# Patient Record
Sex: MALE | Race: WHITE | NOT HISPANIC OR LATINO | Employment: FULL TIME | URBAN - NONMETROPOLITAN AREA
[De-identification: names, ages, dates, MRNs, and addresses within clinical notes are randomized per-mention and may not be internally consistent; named-entity substitution may affect disease eponyms.]

---

## 2023-03-02 LAB
ACTIVATED PARTIAL THROMBOPLASTIN TIME IN PPP BY COAGULATION ASSAY: 44 SEC (ref 26–39)
ANION GAP IN SER/PLAS: 14 MMOL/L (ref 10–20)
CALCIUM (MG/DL) IN SER/PLAS: 9.6 MG/DL (ref 8.6–10.3)
CARBON DIOXIDE, TOTAL (MMOL/L) IN SER/PLAS: 26 MMOL/L (ref 21–32)
CHLORIDE (MMOL/L) IN SER/PLAS: 102 MMOL/L (ref 98–107)
CREATININE (MG/DL) IN SER/PLAS: 0.87 MG/DL (ref 0.5–1.3)
ERYTHROCYTE DISTRIBUTION WIDTH (RATIO) BY AUTOMATED COUNT: 13.6 % (ref 11.5–14.5)
ERYTHROCYTE MEAN CORPUSCULAR HEMOGLOBIN CONCENTRATION (G/DL) BY AUTOMATED: 34.7 G/DL (ref 32–36)
ERYTHROCYTE MEAN CORPUSCULAR VOLUME (FL) BY AUTOMATED COUNT: 92 FL (ref 80–100)
ERYTHROCYTES (10*6/UL) IN BLOOD BY AUTOMATED COUNT: 5.09 X10E12/L (ref 4.5–5.9)
GFR MALE: >90 ML/MIN/1.73M2
GLUCOSE (MG/DL) IN SER/PLAS: 88 MG/DL (ref 74–99)
HEMATOCRIT (%) IN BLOOD BY AUTOMATED COUNT: 46.7 % (ref 41–52)
HEMOGLOBIN (G/DL) IN BLOOD: 16.2 G/DL (ref 13.5–17.5)
INR IN PPP BY COAGULATION ASSAY: 1.4 (ref 0.9–1.1)
LEUKOCYTES (10*3/UL) IN BLOOD BY AUTOMATED COUNT: 8.7 X10E9/L (ref 4.4–11.3)
PLATELETS (10*3/UL) IN BLOOD AUTOMATED COUNT: 185 X10E9/L (ref 150–450)
POTASSIUM (MMOL/L) IN SER/PLAS: 4.1 MMOL/L (ref 3.5–5.3)
PROTHROMBIN TIME (PT) IN PPP BY COAGULATION ASSAY: 16.4 SEC (ref 9.8–13.4)
SODIUM (MMOL/L) IN SER/PLAS: 138 MMOL/L (ref 136–145)
UREA NITROGEN (MG/DL) IN SER/PLAS: 15 MG/DL (ref 6–23)

## 2023-06-13 PROBLEM — I48.0 PAROXYSMAL ATRIAL FIBRILLATION (MULTI): Status: ACTIVE | Noted: 2023-06-13

## 2023-06-13 PROBLEM — I10 BENIGN HYPERTENSION: Status: ACTIVE | Noted: 2023-06-13

## 2023-06-13 NOTE — PROGRESS NOTES
Subjective   Patient ID:   Armaan Lane is a 68 y.o. male who presents for Establish Care.  HPI  New patient here today to establish care with myself.  Last PCP: Dr. Goznalez  Last seen: 2017    A-fib/HTN:  Seeing cardiology - Dr. Saha.  Taking Eliquis, Metoprolol, Losartan.  BP today is 128/80.  Denies CP, SOB.    HLD:  Taking Rosuvastatin.  DUE for labs.    Forgetfulness:  Has family history of dementia.  Family has been worried that he has been more forgetful lately.  Would like to see neurology.    Health maintenance:  Smoking: Never a smoker.  PSA (50+): DUE  Labs: March 2023 (basic). DUE  Colonoscopy (50-75): DUE  Prevnar 13 (65+):  Pneumovax 23 (65+, 1 year after Prev 13):  Influenza:  Zostavax (60+, 1 time, at pharmacy):    Review of Systems  12 point review of systems negative unless stated above in HPI    Vitals:    06/21/23 1021   BP: 128/80   Pulse: 95   SpO2: 96%       Physical Exam  General: Alert and oriented, well nourished, no acute distress.  Lungs: Clear to auscultation, non-labored respiration.  Heart: Normal rate, regular rhythm, no murmur, gallop or edema.  Neurologic: Awake, alert, and oriented X3, CN II-XII intact.  Psychiatric: Cooperative, appropriate mood and affect.    Assessment/Plan   It was nice meeting you!  I have ordered some labs to be done as soon as you can.  We will call you with the results.  Continue specialty care.  I have placed a referral to neurology for further management.  Continue the same medications.  Chronic conditions are stable.  Call with questions or concerns.    F/u  6 months  Diagnoses and all orders for this visit:  Benign hypertension  -     Comprehensive Metabolic Panel; Future  -     Lipid Panel; Future  -     CBC and Auto Differential; Future  Paroxysmal atrial fibrillation (CMS/HCC)  Screening PSA (prostate specific antigen)  -     Prostate Specific Antigen, Screen; Future  Obesity, morbid (CMS/HCC)  Pure hypercholesterolemia  Forgetfulness  -      Referral to Neurology; Future

## 2023-06-21 ENCOUNTER — OFFICE VISIT (OUTPATIENT)
Dept: PRIMARY CARE | Facility: CLINIC | Age: 68
End: 2023-06-21
Payer: MEDICARE

## 2023-06-21 VITALS
WEIGHT: 267.1 LBS | OXYGEN SATURATION: 96 % | BODY MASS INDEX: 41.92 KG/M2 | DIASTOLIC BLOOD PRESSURE: 80 MMHG | SYSTOLIC BLOOD PRESSURE: 128 MMHG | HEART RATE: 95 BPM | HEIGHT: 67 IN

## 2023-06-21 DIAGNOSIS — Z12.5 SCREENING PSA (PROSTATE SPECIFIC ANTIGEN): ICD-10-CM

## 2023-06-21 DIAGNOSIS — R68.89 FORGETFULNESS: ICD-10-CM

## 2023-06-21 DIAGNOSIS — E66.01 OBESITY, MORBID (MULTI): ICD-10-CM

## 2023-06-21 DIAGNOSIS — I10 BENIGN HYPERTENSION: Primary | ICD-10-CM

## 2023-06-21 DIAGNOSIS — E78.00 PURE HYPERCHOLESTEROLEMIA: ICD-10-CM

## 2023-06-21 DIAGNOSIS — I48.0 PAROXYSMAL ATRIAL FIBRILLATION (MULTI): ICD-10-CM

## 2023-06-21 PROCEDURE — 1160F RVW MEDS BY RX/DR IN RCRD: CPT | Performed by: PHYSICIAN ASSISTANT

## 2023-06-21 PROCEDURE — 3079F DIAST BP 80-89 MM HG: CPT | Performed by: PHYSICIAN ASSISTANT

## 2023-06-21 PROCEDURE — 99203 OFFICE O/P NEW LOW 30 MIN: CPT | Performed by: PHYSICIAN ASSISTANT

## 2023-06-21 PROCEDURE — 1036F TOBACCO NON-USER: CPT | Performed by: PHYSICIAN ASSISTANT

## 2023-06-21 PROCEDURE — 3074F SYST BP LT 130 MM HG: CPT | Performed by: PHYSICIAN ASSISTANT

## 2023-06-21 PROCEDURE — 1159F MED LIST DOCD IN RCRD: CPT | Performed by: PHYSICIAN ASSISTANT

## 2023-06-21 PROCEDURE — 1170F FXNL STATUS ASSESSED: CPT | Performed by: PHYSICIAN ASSISTANT

## 2023-06-21 RX ORDER — METOPROLOL TARTRATE 50 MG/1
50 TABLET ORAL
COMMUNITY
Start: 2023-05-31

## 2023-06-21 RX ORDER — DOFETILIDE 0.25 MG/1
1 CAPSULE ORAL 2 TIMES DAILY
COMMUNITY
Start: 2015-06-02

## 2023-06-21 RX ORDER — METOPROLOL TARTRATE 25 MG/1
25 TABLET, FILM COATED ORAL 2 TIMES DAILY
COMMUNITY
Start: 2023-04-05

## 2023-06-21 RX ORDER — APIXABAN 5 MG/1
5 TABLET, FILM COATED ORAL 2 TIMES DAILY
COMMUNITY

## 2023-06-21 RX ORDER — ROSUVASTATIN CALCIUM 5 MG/1
5 TABLET, COATED ORAL DAILY
COMMUNITY
Start: 2023-04-05

## 2023-06-21 RX ORDER — LOSARTAN POTASSIUM 100 MG/1
50 TABLET ORAL DAILY
COMMUNITY

## 2023-06-21 ASSESSMENT — ACTIVITIES OF DAILY LIVING (ADL)
DRESSING: INDEPENDENT
JUDGMENT_ADEQUATE_SAFELY_COMPLETE_DAILY_ACTIVITIES: YES
ADEQUATE_TO_COMPLETE_ADL: YES
TAKING MEDICATION: INDEPENDENT
HEARING - LEFT EAR: FUNCTIONAL
HEARING - RIGHT EAR: HEARING AID
TOILETING: INDEPENDENT
PREPARING MEALS: INDEPENDENT
MANAGING FINANCES: INDEPENDENT
USING TRANSPORTATION: INDEPENDENT
FEEDING YOURSELF: INDEPENDENT
WALKS IN HOME: INDEPENDENT
DOING HOUSEWORK: INDEPENDENT
ADEQUATE_TO_COMPLETE_ADL: YES
BATHING: INDEPENDENT
PATIENT'S MEMORY ADEQUATE TO SAFELY COMPLETE DAILY ACTIVITIES?: YES
TOILETING: INDEPENDENT
PILL BOX USED: YES
BATHING: INDEPENDENT
DRESSING YOURSELF: INDEPENDENT
EATING: INDEPENDENT
NEEDS ASSISTANCE WITH FOOD: INDEPENDENT
JUDGMENT_ADEQUATE_SAFELY_COMPLETE_DAILY_ACTIVITIES: YES
USING TELEPHONE: INDEPENDENT
GROOMING: INDEPENDENT
STIL DRIVING: YES
GROCERY SHOPPING: INDEPENDENT
FEEDING: INDEPENDENT

## 2023-06-21 ASSESSMENT — PATIENT HEALTH QUESTIONNAIRE - PHQ9
2. FEELING DOWN, DEPRESSED OR HOPELESS: NOT AT ALL
1. LITTLE INTEREST OR PLEASURE IN DOING THINGS: NOT AT ALL
SUM OF ALL RESPONSES TO PHQ9 QUESTIONS 1 AND 2: 0

## 2023-12-18 PROBLEM — L03.116 CELLULITIS OF LEFT LOWER EXTREMITY: Status: ACTIVE | Noted: 2017-01-31

## 2023-12-18 PROBLEM — F90.9 ADHD (ATTENTION DEFICIT HYPERACTIVITY DISORDER): Status: ACTIVE | Noted: 2023-12-18

## 2023-12-18 PROBLEM — F32.A DEPRESSION: Status: ACTIVE | Noted: 2023-12-18

## 2023-12-18 PROBLEM — S81.801A TRAUMATIC OPEN WOUND OF RIGHT LOWER LEG: Status: ACTIVE | Noted: 2023-12-18

## 2023-12-18 PROBLEM — R60.0 PEDAL EDEMA: Status: ACTIVE | Noted: 2023-12-18

## 2023-12-18 PROBLEM — L02.415 ABSCESS OF LEG, RIGHT: Status: ACTIVE | Noted: 2023-12-18

## 2023-12-18 PROBLEM — N40.0 BENIGN PROSTATIC HYPERPLASIA: Status: ACTIVE | Noted: 2023-12-18

## 2023-12-20 ENCOUNTER — APPOINTMENT (OUTPATIENT)
Dept: PRIMARY CARE | Facility: CLINIC | Age: 68
End: 2023-12-20
Payer: MEDICARE

## 2024-07-22 NOTE — PATIENT INSTRUCTIONS
It was nice meeting you today, Taqueria!     As we discussed, I have placed some routine lab orders in the system that were not completed during your recent hospital stay.   Please have your blood drawn in the next 1-2 weeks.   You need to be FASTING for 12 hours prior to blood draw.  Please contact your insurance company to ask about the best location to get blood drawn.  We will contact you with the results of your blood work and any necessary adjustments to your plan of care.  Iif you do not hear from us within 3-5 days of having your blood drawn, please call the [place] office at [phone]     I also ordered a Cologuard for you. It will be sent to your home to complete.      Additionally, I ordered the CT Cardiac Calcium score test. This is free and is offered by .   What is Coronary Artery Calcium Scoring?    -This test is used to determine your future risk of heart attack.  -A calcium score test is a noninvasive, low dose CT scan, performed without an intravenous line or any contrast material.  -The appointment takes approximately 20 to 30 minutes, with the actual CT scan about 10 to 15 minutes of that time.  -Coronary calcium scoring involves a small portion of radiation, similar to or lower than that used in other screenings.  -The test measures the amount of calcium that has accumulated in the walls of the coronary arteries and provides what is termed a “coronary artery calcium score” to help interpret your risk level.    Who Should Get a Calcium Score Test?    Men and women age 45 or older, with no history of coronary artery disease, and with one or more risk factors for heart disease, including:  -High blood cholesterol  -Low HDL cholesterol (“good cholesterol”)  -High blood pressure  -Cigarette smoking  -Type 2 diabetes  -Family history of heart disease at age 55 or younger in men and 65 or younger in women  -Chronic inflammatory condition (e.g., inflammatory bowel disease, lupus, rheumatoid  arthritis, ankylosing spondylitis, psoriasis, HIV/AIDS)  -Female conditions such as preeclampsia or early menopause (younger than 40)  -Chronic kidney disease     Follow up with cardiology as scheduled   Weight yourself daily around the same time every day. If you notice more than 2-3 pound weight gain in one day, please call.     Follow up in 3 months.

## 2024-07-22 NOTE — PROGRESS NOTES
Patient ID:   Armaan Lane is a 69 y.o. male with PMH remarkable for Hypertension, pAF, BPH, Obesity, Depression, ADHD, CHF, and Hyperlipidemia who presents to the office today to establish care and as a follow up visit from the ER.   No chief complaint on file..    HEALTH MAINTENANCE: Establish Care  Previous PCP: Aguilar Zamarripa PA-C  Last Office Visit: 6/21/2023  Last Labs: 7/19/2024  PSA Screening (starting at age 55 till 69): Ordered   Colonoscopy (45-75 or age 40 with 1st degree relative dx colon ca): Never had a colonoscopy; denies family hx, willing to do Cologuard   Lung cancer screening (50-76 y/o + 20 pack year + smoking/quit in last 15 years): NA   Cardiac Calcium Scoring (55+, q 10 years): ordered   DEXA (65+, q 2 years):     Armaan was recently in the ER and then admitted to the ICU. He was found to be in A-Fib with RVR, pulmonary edema, and septic secondary to a UTI. He was there from 7/13-7/19.  Following with Dr. Saha in Cardiology 7/30/2024    Today he reports feeling much better.     HPI  Hypertension  Today BP in office is stable at 117/72  Denies SOB, CP, no palpitations, headaches    -currently taking Losartan 50 mg every day instead of the 100 mg as directed on the discharge summary   -Will increase dose when he gets home   -Cardizem 240 mg every day    A-Fib  He was admitted to ICU on cardizem drip. His HR improved, and transitioned to oral cardizem and started on Digoxin after loading dose. HR improved significantly. Cardiology recommended discontinuing Tikosyn.  -Digoxin 125 mcg every day   -Metoprolol 75 mg BID  -Eliquis 5 mg BID       Hyperlipidemia  Last lipid panel 12/2021 WNL  -Crestor 5 mg every day     CHF  During his hospital stay, he had an echocardiogram done that showed normal LVEF and no regional wall motion abnormalities.   -Lasix 40 mg every day   -Compression socks     Social History     Tobacco Use    Smoking status: Never    Smokeless tobacco: Never   Vaping Use     Vaping status: Never Used   Substance Use Topics    Alcohol use: Never    Drug use: Never       Immunization History   Administered Date(s) Administered    Flu vaccine, quadrivalent, high-dose, preservative free, age 65y+ (FLUZONE) 11/24/2020    Influenza, High Dose Seasonal, Preservative Free 10/12/2019    Influenza, Seasonal, Quadrivalent, Adjuvanted 11/17/2021    Influenza, seasonal, injectable, preservative free 10/22/2015, 11/22/2017    Moderna SARS-CoV-2 Vaccination 07/11/2021, 08/08/2021       Review of Systems   Constitutional:  Positive for fatigue.   Respiratory: Negative.  Negative for cough and shortness of breath.    Cardiovascular: Negative.  Negative for chest pain, palpitations and leg swelling.   Gastrointestinal: Negative.    Genitourinary: Negative.  Negative for frequency, hematuria and urgency.   Neurological: Negative.  Negative for dizziness, light-headedness and headaches.   Psychiatric/Behavioral: Negative.  Negative for sleep disturbance. The patient is not nervous/anxious.        No Known Allergies     Visit Vitals  Smoking Status Never       Physical Exam  Constitutional:       General: He is not in acute distress.     Appearance: Normal appearance. He is obese.   HENT:      Head: Normocephalic.   Cardiovascular:      Rate and Rhythm: Normal rate. Rhythm irregular.      Heart sounds: Normal heart sounds. No murmur heard.     No friction rub. No gallop.   Pulmonary:      Effort: Pulmonary effort is normal.      Breath sounds: Normal breath sounds. No wheezing, rhonchi or rales.   Abdominal:      General: Bowel sounds are normal. There is no distension.      Palpations: Abdomen is soft.      Tenderness: There is no abdominal tenderness.   Musculoskeletal:         General: Normal range of motion.      Cervical back: Normal range of motion.   Skin:     General: Skin is warm.   Neurological:      Mental Status: He is alert and oriented to person, place, and time.   Psychiatric:         Mood  and Affect: Mood normal.         Behavior: Behavior normal.         Current Outpatient Medications   Medication Instructions    Eliquis 5 mg, oral, 2 times daily    losartan (COZAAR) 50 mg, oral, Daily    metoprolol tartrate (LOPRESSOR) 25 mg, oral, 2 times daily    metoprolol tartrate (LOPRESSOR) 50 mg, oral, 2 times daily (morning and late afternoon)    rosuvastatin (CRESTOR) 5 mg, oral, Daily    Tikosyn 250 mcg capsule 1 capsule, oral, 2 times daily       Lab Results   Component Value Date    WBC 8.7 03/02/2023    HGB 16.2 03/02/2023    HCT 46.7 03/02/2023     03/02/2023    CHOL 121 12/18/2021    TRIG 71 12/18/2021    HDL 40.4 12/18/2021    ALT 27 12/18/2021    AST 20 12/18/2021     03/02/2023    K 4.1 03/02/2023     03/02/2023    CREATININE 0.87 03/02/2023    BUN 15 03/02/2023    CO2 26 03/02/2023    INR 1.4 (H) 03/02/2023    HGBA1C 6.0 07/14/2024       ASSESSMENT AND PLAN:  Assessment/Plan   Problem List Items Addressed This Visit             ICD-10-CM    Benign hypertension I10     As noted in HPI  Today BP in office is stable at 117/72  Denies SOB, CP, palpitations, and headaches    -currently taking Losartan 50 mg every day instead of the 100 mg as directed on the discharge summary   -Will increase dose when he gets home   -Cardizem 240 mg every day         Paroxysmal atrial fibrillation (Multi) I48.0     As noted in HPI  He was admitted to ICU on cardizem drip. His HR improved, and transitioned to oral cardizem and started on Digoxin after loading dose. HR improved significantly. Cardiology recommended discontinuing Tikosyn.  -Digoxin 125 mcg every day   -Metoprolol 75 mg BID  -Eliquis 5 mg BID   -On auscultation today he is in a-fib.   -Follow up with cardiology on 7/30.          Relevant Medications    dilTIAZem CD (Cardizem CD) 240 mg 24 hr capsule    digoxin (Lanoxin) 125 MCG tablet    Pure hypercholesterolemia E78.00     Last lipid panel 12/2021 WNL  -Crestor 5 mg every day   -Will  repeat labs  -Ordered CT Calcium Cardiac Score          Acute left-sided CHF (congestive heart failure) (Multi) I50.1     During his hospital stay from 7/13-7/19, he had an echocardiogram done that showed normal LVEF and no regional wall motion abnormalities.   -Lasix 40 mg every day   -Compression socks   -Discussed weighing daily to monitor for weight changes of more than 2-3 pounds at a time  -Discussed low sodium and heart healthy diet          Relevant Medications    dilTIAZem CD (Cardizem CD) 240 mg 24 hr capsule    digoxin (Lanoxin) 125 MCG tablet     Other Visit Diagnoses         Codes    Screening PSA (prostate specific antigen)    -  Primary Z12.5    Relevant Orders    Prostate Specific Antigen, Screen    Healthcare maintenance     Z00.00    Relevant Orders    CBC and Auto Differential    Comprehensive Metabolic Panel    Lipid Panel    Hemoglobin A1C    Vitamin D 25-Hydroxy,Total (for eval of Vitamin D levels)    Prostate Specific Antigen, Screen    Encounter for colorectal cancer screening using Cologuard test     Z12.11, Z12.12    Relevant Orders    Cologuard® colon cancer screening    Screening for cardiovascular condition     Z13.6    Relevant Orders    CT cardiac scoring wo IV contrast    Screening due     Z13.9    Relevant Orders    Hepatitis C antibody    Acute cystitis without hematuria     N30.00    Relevant Orders    CBC and Auto Differential    Body mass index (BMI) 40.0-44.9, adult (Multi)     Z68.41    Relevant Orders    Vitamin D 25-Hydroxy,Total (for eval of Vitamin D levels)          Assessment/Plan   --------------------  PSA  Hep C  Yearly labs  Cologaurd  CT cardiac calcium scoring  Daily weights  Increase Losartan from 50 -100 mg every day as directed   Follow up in 3 months

## 2024-07-25 PROBLEM — E66.812 OBESITY, CLASS II, BMI 35-39.9: Status: ACTIVE | Noted: 2024-07-16

## 2024-07-25 PROBLEM — I50.1: Status: ACTIVE | Noted: 2024-07-14

## 2024-07-25 PROBLEM — E66.9 OBESITY, CLASS II, BMI 35-39.9: Status: ACTIVE | Noted: 2024-07-16

## 2024-07-26 ENCOUNTER — OFFICE VISIT (OUTPATIENT)
Dept: PRIMARY CARE | Facility: CLINIC | Age: 69
End: 2024-07-26
Payer: MEDICARE

## 2024-07-26 VITALS
DIASTOLIC BLOOD PRESSURE: 72 MMHG | BODY MASS INDEX: 42.19 KG/M2 | WEIGHT: 268.8 LBS | HEART RATE: 85 BPM | OXYGEN SATURATION: 94 % | HEIGHT: 67 IN | SYSTOLIC BLOOD PRESSURE: 117 MMHG

## 2024-07-26 DIAGNOSIS — E78.00 PURE HYPERCHOLESTEROLEMIA: ICD-10-CM

## 2024-07-26 DIAGNOSIS — N30.00 ACUTE CYSTITIS WITHOUT HEMATURIA: ICD-10-CM

## 2024-07-26 DIAGNOSIS — Z12.12 ENCOUNTER FOR COLORECTAL CANCER SCREENING USING COLOGUARD TEST: ICD-10-CM

## 2024-07-26 DIAGNOSIS — I10 BENIGN HYPERTENSION: ICD-10-CM

## 2024-07-26 DIAGNOSIS — Z00.00 HEALTHCARE MAINTENANCE: ICD-10-CM

## 2024-07-26 DIAGNOSIS — Z12.5 SCREENING PSA (PROSTATE SPECIFIC ANTIGEN): Primary | ICD-10-CM

## 2024-07-26 DIAGNOSIS — I50.1 ACUTE LEFT-SIDED CHF (CONGESTIVE HEART FAILURE) (MULTI): ICD-10-CM

## 2024-07-26 DIAGNOSIS — I48.0 PAROXYSMAL ATRIAL FIBRILLATION (MULTI): ICD-10-CM

## 2024-07-26 DIAGNOSIS — Z12.11 ENCOUNTER FOR COLORECTAL CANCER SCREENING USING COLOGUARD TEST: ICD-10-CM

## 2024-07-26 DIAGNOSIS — Z13.6 SCREENING FOR CARDIOVASCULAR CONDITION: ICD-10-CM

## 2024-07-26 DIAGNOSIS — Z13.9 SCREENING DUE: ICD-10-CM

## 2024-07-26 PROCEDURE — 3074F SYST BP LT 130 MM HG: CPT

## 2024-07-26 PROCEDURE — 1124F ACP DISCUSS-NO DSCNMKR DOCD: CPT

## 2024-07-26 PROCEDURE — 1126F AMNT PAIN NOTED NONE PRSNT: CPT

## 2024-07-26 PROCEDURE — 1159F MED LIST DOCD IN RCRD: CPT

## 2024-07-26 PROCEDURE — 1036F TOBACCO NON-USER: CPT

## 2024-07-26 PROCEDURE — 1160F RVW MEDS BY RX/DR IN RCRD: CPT

## 2024-07-26 PROCEDURE — 1157F ADVNC CARE PLAN IN RCRD: CPT

## 2024-07-26 PROCEDURE — 3078F DIAST BP <80 MM HG: CPT

## 2024-07-26 PROCEDURE — 99496 TRANSJ CARE MGMT HIGH F2F 7D: CPT

## 2024-07-26 RX ORDER — FUROSEMIDE 40 MG/1
40 TABLET ORAL DAILY
COMMUNITY

## 2024-07-26 RX ORDER — DILTIAZEM HYDROCHLORIDE 240 MG/1
240 CAPSULE, COATED, EXTENDED RELEASE ORAL DAILY
COMMUNITY

## 2024-07-26 RX ORDER — DIGOXIN 125 MCG
125 TABLET ORAL DAILY
COMMUNITY

## 2024-07-26 ASSESSMENT — COLUMBIA-SUICIDE SEVERITY RATING SCALE - C-SSRS
6. HAVE YOU EVER DONE ANYTHING, STARTED TO DO ANYTHING, OR PREPARED TO DO ANYTHING TO END YOUR LIFE?: NO
2. HAVE YOU ACTUALLY HAD ANY THOUGHTS OF KILLING YOURSELF?: NO
1. IN THE PAST MONTH, HAVE YOU WISHED YOU WERE DEAD OR WISHED YOU COULD GO TO SLEEP AND NOT WAKE UP?: NO

## 2024-07-26 ASSESSMENT — ENCOUNTER SYMPTOMS
FREQUENCY: 0
RESPIRATORY NEGATIVE: 1
CARDIOVASCULAR NEGATIVE: 1
HEADACHES: 0
PALPITATIONS: 0
FATIGUE: 1
GASTROINTESTINAL NEGATIVE: 1
LIGHT-HEADEDNESS: 0
PSYCHIATRIC NEGATIVE: 1
NEUROLOGICAL NEGATIVE: 1
HEMATURIA: 0
DIZZINESS: 0
COUGH: 0
SHORTNESS OF BREATH: 0
NERVOUS/ANXIOUS: 0
SLEEP DISTURBANCE: 0

## 2024-07-26 ASSESSMENT — PAIN SCALES - GENERAL: PAINLEVEL: 0-NO PAIN

## 2024-07-26 NOTE — ASSESSMENT & PLAN NOTE
During his hospital stay from 7/13-7/19, he had an echocardiogram done that showed normal LVEF and no regional wall motion abnormalities.   -Lasix 40 mg every day   -Compression socks   -Discussed weighing daily to monitor for weight changes of more than 2-3 pounds at a time  -Discussed low sodium and heart healthy diet

## 2024-07-26 NOTE — PROGRESS NOTES
Pt states he has had a lack of energy, follow up from hospital keeps getting hot flashes and getting sweating.

## 2024-07-26 NOTE — ASSESSMENT & PLAN NOTE
As noted in HPI  Today BP in office is stable at 117/72  Denies SOB, CP, palpitations, and headaches    -currently taking Losartan 50 mg every day instead of the 100 mg as directed on the discharge summary   -Will increase dose when he gets home   -Cardizem 240 mg every day

## 2024-07-26 NOTE — ASSESSMENT & PLAN NOTE
As noted in HPI  He was admitted to ICU on cardizem drip. His HR improved, and transitioned to oral cardizem and started on Digoxin after loading dose. HR improved significantly. Cardiology recommended discontinuing Tikosyn.  -Digoxin 125 mcg every day   -Metoprolol 75 mg BID  -Eliquis 5 mg BID   -On auscultation today he is in a-fib.   -Follow up with cardiology on 7/30.

## 2024-07-26 NOTE — ASSESSMENT & PLAN NOTE
Last lipid panel 12/2021 WNL  -Crestor 5 mg every day   -Will repeat labs  -Ordered CT Calcium Cardiac Score

## 2024-07-27 ENCOUNTER — LAB (OUTPATIENT)
Dept: LAB | Facility: LAB | Age: 69
End: 2024-07-27
Payer: MEDICARE

## 2024-07-27 DIAGNOSIS — Z13.9 SCREENING DUE: ICD-10-CM

## 2024-07-27 DIAGNOSIS — N30.00 ACUTE CYSTITIS WITHOUT HEMATURIA: ICD-10-CM

## 2024-07-27 DIAGNOSIS — Z00.00 HEALTHCARE MAINTENANCE: ICD-10-CM

## 2024-07-27 DIAGNOSIS — Z12.5 SCREENING PSA (PROSTATE SPECIFIC ANTIGEN): ICD-10-CM

## 2024-07-27 LAB
25(OH)D3 SERPL-MCNC: 21 NG/ML (ref 30–100)
ALBUMIN SERPL BCP-MCNC: 3.7 G/DL (ref 3.4–5)
ALP SERPL-CCNC: 56 U/L (ref 33–136)
ALT SERPL W P-5'-P-CCNC: 23 U/L (ref 10–52)
ANION GAP SERPL CALC-SCNC: 14 MMOL/L (ref 10–20)
AST SERPL W P-5'-P-CCNC: 15 U/L (ref 9–39)
BASOPHILS # BLD AUTO: 0.06 X10*3/UL (ref 0–0.1)
BASOPHILS NFR BLD AUTO: 0.6 %
BILIRUB SERPL-MCNC: 0.7 MG/DL (ref 0–1.2)
BUN SERPL-MCNC: 17 MG/DL (ref 6–23)
CALCIUM SERPL-MCNC: 8.8 MG/DL (ref 8.6–10.3)
CHLORIDE SERPL-SCNC: 100 MMOL/L (ref 98–107)
CHOLEST SERPL-MCNC: 122 MG/DL (ref 0–199)
CHOLESTEROL/HDL RATIO: 3.2
CO2 SERPL-SCNC: 27 MMOL/L (ref 21–32)
CREAT SERPL-MCNC: 0.84 MG/DL (ref 0.5–1.3)
EGFRCR SERPLBLD CKD-EPI 2021: >90 ML/MIN/1.73M*2
EOSINOPHIL # BLD AUTO: 0.16 X10*3/UL (ref 0–0.4)
EOSINOPHIL NFR BLD AUTO: 1.5 %
ERYTHROCYTE [DISTWIDTH] IN BLOOD BY AUTOMATED COUNT: 14 % (ref 11.5–14.5)
EST. AVERAGE GLUCOSE BLD GHB EST-MCNC: 126 MG/DL
GLUCOSE SERPL-MCNC: 99 MG/DL (ref 74–99)
HBA1C MFR BLD: 6 %
HCT VFR BLD AUTO: 42 % (ref 41–52)
HCV AB SER QL: NONREACTIVE
HDLC SERPL-MCNC: 37.9 MG/DL
HGB BLD-MCNC: 13.5 G/DL (ref 13.5–17.5)
IMM GRANULOCYTES # BLD AUTO: 0.03 X10*3/UL (ref 0–0.5)
IMM GRANULOCYTES NFR BLD AUTO: 0.3 % (ref 0–0.9)
LDLC SERPL CALC-MCNC: 66 MG/DL
LYMPHOCYTES # BLD AUTO: 2.95 X10*3/UL (ref 0.8–3)
LYMPHOCYTES NFR BLD AUTO: 27.4 %
MCH RBC QN AUTO: 31.3 PG (ref 26–34)
MCHC RBC AUTO-ENTMCNC: 32.1 G/DL (ref 32–36)
MCV RBC AUTO: 97 FL (ref 80–100)
MONOCYTES # BLD AUTO: 1.06 X10*3/UL (ref 0.05–0.8)
MONOCYTES NFR BLD AUTO: 9.9 %
NEUTROPHILS # BLD AUTO: 6.49 X10*3/UL (ref 1.6–5.5)
NEUTROPHILS NFR BLD AUTO: 60.3 %
NON HDL CHOLESTEROL: 84 MG/DL (ref 0–149)
NRBC BLD-RTO: 0 /100 WBCS (ref 0–0)
PLATELET # BLD AUTO: 245 X10*3/UL (ref 150–450)
POTASSIUM SERPL-SCNC: 4.2 MMOL/L (ref 3.5–5.3)
PROT SERPL-MCNC: 6.8 G/DL (ref 6.4–8.2)
PSA SERPL-MCNC: 10.93 NG/ML
RBC # BLD AUTO: 4.32 X10*6/UL (ref 4.5–5.9)
SODIUM SERPL-SCNC: 137 MMOL/L (ref 136–145)
TRIGL SERPL-MCNC: 92 MG/DL (ref 0–149)
VLDL: 18 MG/DL (ref 0–40)
WBC # BLD AUTO: 10.8 X10*3/UL (ref 4.4–11.3)

## 2024-07-27 PROCEDURE — 36415 COLL VENOUS BLD VENIPUNCTURE: CPT

## 2024-07-27 PROCEDURE — G0103 PSA SCREENING: HCPCS

## 2024-07-27 PROCEDURE — 82306 VITAMIN D 25 HYDROXY: CPT

## 2024-07-29 ASSESSMENT — ENCOUNTER SYMPTOMS
HEMATURIA: 0
NERVOUS/ANXIOUS: 0
FREQUENCY: 0
COUGH: 0
HEADACHES: 0
LIGHT-HEADEDNESS: 0
DIZZINESS: 0
GASTROINTESTINAL NEGATIVE: 1
PSYCHIATRIC NEGATIVE: 1
SLEEP DISTURBANCE: 0
NEUROLOGICAL NEGATIVE: 1
FATIGUE: 1

## 2024-07-29 NOTE — RESULT ENCOUNTER NOTE
His A1C is elevated at 6.0.  A mildly elevated hemoglobin A1c (5.7-6.4%) indicates an increased risk for developing diabetes, or that your diabetes is under very good control.     An A1c of 6.5% or higher indicates a diagnosis of diabetes, and an A1c under 7-8% generally indicates acceptable control of diabetes.     Recommend a low-sugar, low-simple-carbohydrate, low-cholesterol, heart-healthy diet and lifestyle, and regular cardio exercise and weight loss as appropriate.    Your HgbA1c can be monitored every 3 months and will give us an average of what your blood glucose levels have been running during that time. This is not a test that you need to be fasting for. This can be done in office OR in the outpatient lab.    Also, his Vitamin D level is low. He should start a vitamin d supplement. 2000 units daily. Any brand OTC is fine.     Also, he has a high PSA level. I believe this to be from his recent hospitalization and being septic from a UTI, he probably had a catheter in place.   I would like to repeat his PSA in 3-4 months. I will place the order. He is currently scheduled to come back for a follow up in October. We can repeat the labs then.

## 2024-08-01 DIAGNOSIS — R19.5 POSITIVE COLORECTAL CANCER SCREENING USING COLOGUARD TEST: Primary | ICD-10-CM

## 2024-08-01 LAB — NONINV COLON CA DNA+OCC BLD SCRN STL QL: POSITIVE

## 2024-10-28 ENCOUNTER — APPOINTMENT (OUTPATIENT)
Dept: PRIMARY CARE | Facility: CLINIC | Age: 69
End: 2024-10-28
Payer: MEDICARE

## 2024-10-28 VITALS
HEART RATE: 80 BPM | SYSTOLIC BLOOD PRESSURE: 127 MMHG | WEIGHT: 270.3 LBS | DIASTOLIC BLOOD PRESSURE: 82 MMHG | HEIGHT: 67 IN | OXYGEN SATURATION: 95 % | BODY MASS INDEX: 42.43 KG/M2

## 2024-10-28 DIAGNOSIS — I50.1: ICD-10-CM

## 2024-10-28 DIAGNOSIS — R73.03 PREDIABETES: ICD-10-CM

## 2024-10-28 DIAGNOSIS — I10 BENIGN HYPERTENSION: ICD-10-CM

## 2024-10-28 DIAGNOSIS — Z12.5 SCREENING PSA (PROSTATE SPECIFIC ANTIGEN): Primary | ICD-10-CM

## 2024-10-28 DIAGNOSIS — N40.0 BENIGN PROSTATIC HYPERPLASIA WITHOUT LOWER URINARY TRACT SYMPTOMS: ICD-10-CM

## 2024-10-28 DIAGNOSIS — E78.00 PURE HYPERCHOLESTEROLEMIA: ICD-10-CM

## 2024-10-28 DIAGNOSIS — E55.9 VITAMIN D DEFICIENCY: ICD-10-CM

## 2024-10-28 DIAGNOSIS — I48.0 PAROXYSMAL ATRIAL FIBRILLATION (MULTI): ICD-10-CM

## 2024-10-28 PROCEDURE — 3008F BODY MASS INDEX DOCD: CPT

## 2024-10-28 PROCEDURE — 3074F SYST BP LT 130 MM HG: CPT

## 2024-10-28 PROCEDURE — 1157F ADVNC CARE PLAN IN RCRD: CPT

## 2024-10-28 PROCEDURE — 1126F AMNT PAIN NOTED NONE PRSNT: CPT

## 2024-10-28 PROCEDURE — 1160F RVW MEDS BY RX/DR IN RCRD: CPT

## 2024-10-28 PROCEDURE — 3079F DIAST BP 80-89 MM HG: CPT

## 2024-10-28 PROCEDURE — 1159F MED LIST DOCD IN RCRD: CPT

## 2024-10-28 PROCEDURE — 99212 OFFICE O/P EST SF 10 MIN: CPT

## 2024-10-28 PROCEDURE — 1036F TOBACCO NON-USER: CPT

## 2024-10-28 ASSESSMENT — COLUMBIA-SUICIDE SEVERITY RATING SCALE - C-SSRS
6. HAVE YOU EVER DONE ANYTHING, STARTED TO DO ANYTHING, OR PREPARED TO DO ANYTHING TO END YOUR LIFE?: NO
1. IN THE PAST MONTH, HAVE YOU WISHED YOU WERE DEAD OR WISHED YOU COULD GO TO SLEEP AND NOT WAKE UP?: NO
2. HAVE YOU ACTUALLY HAD ANY THOUGHTS OF KILLING YOURSELF?: NO

## 2024-10-28 ASSESSMENT — PATIENT HEALTH QUESTIONNAIRE - PHQ9
SUM OF ALL RESPONSES TO PHQ9 QUESTIONS 1 AND 2: 0
1. LITTLE INTEREST OR PLEASURE IN DOING THINGS: NOT AT ALL
2. FEELING DOWN, DEPRESSED OR HOPELESS: NOT AT ALL

## 2024-10-28 ASSESSMENT — ENCOUNTER SYMPTOMS
SHORTNESS OF BREATH: 1
PALPITATIONS: 0
DIFFICULTY URINATING: 0
CARDIOVASCULAR NEGATIVE: 1

## 2024-10-28 ASSESSMENT — PAIN SCALES - GENERAL: PAINLEVEL_OUTOF10: 0-NO PAIN

## 2024-12-06 ENCOUNTER — TELEPHONE (OUTPATIENT)
Dept: PRIMARY CARE | Facility: CLINIC | Age: 69
End: 2024-12-06
Payer: MEDICARE

## 2024-12-06 NOTE — TELEPHONE ENCOUNTER
Patient will be having tooth pulled. Is on Eliquis 5mg. Was told by Dentist office to call in and see if he needs to stop this medication prior to procedure.      New phone number [phone]

## 2025-02-28 ENCOUNTER — APPOINTMENT (OUTPATIENT)
Dept: PRIMARY CARE | Facility: CLINIC | Age: 70
End: 2025-02-28
Payer: MEDICARE

## 2025-03-04 ENCOUNTER — APPOINTMENT (OUTPATIENT)
Dept: PRIMARY CARE | Facility: CLINIC | Age: 70
End: 2025-03-04
Payer: MEDICARE

## 2025-07-25 ENCOUNTER — APPOINTMENT (OUTPATIENT)
Dept: OTOLARYNGOLOGY | Facility: CLINIC | Age: 70
End: 2025-07-25
Payer: MEDICARE

## 2025-07-25 DIAGNOSIS — L29.9 ITCHING OF EAR: Primary | ICD-10-CM

## 2025-07-25 DIAGNOSIS — H61.23 BILATERAL IMPACTED CERUMEN: ICD-10-CM

## 2025-07-25 DIAGNOSIS — L30.9 DERMATITIS: ICD-10-CM

## 2025-07-25 RX ORDER — FLUOCINOLONE ACETONIDE 0.11 MG/ML
OIL AURICULAR (OTIC)
Qty: 20 ML | Refills: 2 | Status: SHIPPED | OUTPATIENT
Start: 2025-07-25

## 2025-07-25 RX ORDER — CLOBETASOL PROPIONATE 0.5 MG/G
OINTMENT TOPICAL
Qty: 45 G | Refills: 11 | Status: SHIPPED | OUTPATIENT
Start: 2025-07-25

## 2025-07-25 ASSESSMENT — ENCOUNTER SYMPTOMS
LOSS OF SENSATION IN FEET: 0
OCCASIONAL FEELINGS OF UNSTEADINESS: 0
DEPRESSION: 0

## 2025-07-25 NOTE — PROGRESS NOTES
"History Of Present Illness  Armaan aLne \"Armaan Meng\" is a 70 y.o. male presenting with: \"Itching/scale in ears\".  He is kindly referred by CHARMAINE Rangel CNP.    He wears a hearing in his right ear. Sometimes he gets scaling and itching.   On examination, there is dry skin, scaling at the entrance of right ear canal. There was wax in ears bilaterally. Cleaning was done. TMs look intact.    Plan;  1- generic dermotic oil for itching inside  2- topical clotrimazole for dermatitis.     Past Medical History  He has a past medical history of A-fib (Multi), Benign prostatic hyperplasia without lower urinary tract symptoms, Ear problems (01/01/2025), Male erectile dysfunction, unspecified, Personal history of other diseases of male genital organs, and Personal history of other endocrine, nutritional and metabolic disease.    Surgical History  He has a past surgical history that includes Sinus surgery (03/27/2015).     Social History  He reports that he has never smoked. He has never used smokeless tobacco. He reports current alcohol use of about 3.0 standard drinks of alcohol per week. He reports that he does not use drugs.    Family History  Family History[1]     Allergies  Patient has no known allergies.    Review of Systems   Difficulty hearing  Loss of hearing  Ear pain, itching  Ears feel full     Physical Exam    General appearance: Healthy-appearing, well-nourished, well groomed, in no acute distress.     Head and Face: Atraumatic with no masses, lesions, or scarring.      Salivary glands: No tenderness of the parotid glands or parotid masses.     No tenderness of the submandibular glands or submandibular masses.      Facial strength: Normal strength and symmetry, no synkinesis or facial tic.     Eyes: Conjunctivas look non-hyperemic bilaterally    Ears: On examination, there is dry skin, scaling at the entrance of right ear canal. There was wax in ears bilaterally. Cleaning was done. TMs look " "intact.    Nose: Septum perforated, mild crusting.     Oral Cavity/Mouth: Lips and tongue look normal.     Throat: Long uvula.    Neck: Symmetrical, trachea midline.     Pulmonary: Normal respiratory effort.     Lymphatic: No palpable pathologic lymph nodes at neck.     Neurological/Psychiatric Orientation to person, place, and time: Normal.     Mood and affect: Normal.      Extremities: No clubbing.     Skin: No significant skin lesions were noted at face or neck        Procedure  EAR WAX REMOVAL  Patient had bilateral ear wax. Using small instrument(s) and/or suction cleaning was done. Patient tolerated the procedure well.          Last Recorded Vitals  There were no vitals taken for this visit.    Relevant Results    Assessment and Plan:  Armaan Lane \"Armaan Meng\" is a 70 y.o. male presenting with: \"Itching/scale in ears\".  He is kindly referred by CHARMAINE Rangel CNP.    He wears a hearing in his right ear. Sometimes he gets scaling and itching.   On examination, there is dry skin, scaling at the entrance of right ear canal. There was wax in ears bilaterally. Cleaning was done. TMs look intact.    Plan;  1- generic dermotic oil for itching inside  2- topical clotrimazole for dermatitis.  3- fu 6 months    Rob Nolasco  Otolaryngology - Head & Neck Surgery         [1]   Family History  Problem Relation Name Age of Onset    Dementia Mother      Prostate cancer Father      Prostate cancer Father's Brother      Dementia Maternal Grandmother      Cancer Father José TORIDavon Lane 80 - 99    Cancer Father's Brother Evangelist (Enzo) Lane     Hearing loss Paternal Grandmother Junie Noble Domingo 70 - 79     "

## 2026-01-23 ENCOUNTER — APPOINTMENT (OUTPATIENT)
Dept: OTOLARYNGOLOGY | Facility: CLINIC | Age: 71
End: 2026-01-23
Payer: MEDICARE